# Patient Record
Sex: MALE | Race: WHITE | NOT HISPANIC OR LATINO | Employment: FULL TIME | URBAN - METROPOLITAN AREA
[De-identification: names, ages, dates, MRNs, and addresses within clinical notes are randomized per-mention and may not be internally consistent; named-entity substitution may affect disease eponyms.]

---

## 2022-05-29 ENCOUNTER — HOSPITAL ENCOUNTER (EMERGENCY)
Facility: CLINIC | Age: 47
Discharge: HOME | End: 2022-05-29
Attending: STUDENT IN AN ORGANIZED HEALTH CARE EDUCATION/TRAINING PROGRAM | Admitting: STUDENT IN AN ORGANIZED HEALTH CARE EDUCATION/TRAINING PROGRAM
Payer: COMMERCIAL

## 2022-05-29 VITALS
HEIGHT: 69 IN | OXYGEN SATURATION: 97 % | SYSTOLIC BLOOD PRESSURE: 116 MMHG | DIASTOLIC BLOOD PRESSURE: 64 MMHG | BODY MASS INDEX: 35.55 KG/M2 | TEMPERATURE: 98.6 F | RESPIRATION RATE: 18 BRPM | WEIGHT: 240 LBS | HEART RATE: 87 BPM

## 2022-05-29 DIAGNOSIS — S81.811A LACERATION OF RIGHT LOWER EXTREMITY, INITIAL ENCOUNTER: ICD-10-CM

## 2022-05-29 PROCEDURE — 12002 RPR S/N/AX/GEN/TRNK2.6-7.5CM: CPT

## 2022-05-29 PROCEDURE — 99283 EMERGENCY DEPT VISIT LOW MDM: CPT

## 2022-05-29 ASSESSMENT — ENCOUNTER SYMPTOMS: WOUND: 1

## 2022-05-30 NOTE — ED TRIAGE NOTES
Pt arrives with laceration 45 minutes prior to arrival to right shin. Bleeding controlled in triage.      Triage Assessment     Row Name 05/29/22 2042       Triage Assessment (Adult)    Airway WDL WDL       Respiratory WDL    Respiratory WDL WDL       Skin Circulation/Temperature WDL    Skin Circulation/Temperature WDL WDL       Cardiac WDL    Cardiac WDL WDL       Peripheral/Neurovascular WDL    Peripheral Neurovascular WDL WDL       Cognitive/Neuro/Behavioral WDL    Cognitive/Neuro/Behavioral WDL WDL

## 2022-05-30 NOTE — ED PROVIDER NOTES
EMERGENCY DEPARTMENT ENCOUNTER      NAME: Nathan Cormier  AGE: 47 year old male  YOB: 1975  MRN: [id number]  EVALUATION DATE & TIME: 5/29/2022  8:45 PM    PCP: No primary care provider on file.    ED PROVIDER: Dov Mittal M.D.      Chief Complaint   Patient presents with     Laceration         FINAL IMPRESSION:  1. Laceration of right lower extremity, initial encounter          ED COURSE & MEDICAL DECISION MAKING:    Pertinent Labs & Imaging studies reviewed. (See chart for details)    8:54 PM Met with patient for initial interview and exam. Discussed initial plan for care for their stay in the emergency department.  9:17 PM Conducted laceration repair.  9:37 PM Completed laceration repair, discussed plans for discharge.    47 year old male presents to the Emergency Department for evaluation of laceration to his right leg.  This was repaired without complication here in the emergency department patient tolerated well.  Will discharge home with follow-up.    At the conclusion of the encounter I discussed the results of all of the tests and the disposition. The questions were answered. The patient or family acknowledged understanding and was agreeable with the care plan.         MEDICATIONS GIVEN IN THE EMERGENCY:  Medications - No data to display    NEW PRESCRIPTIONS STARTED AT TODAY'S ER VISIT  There are no discharge medications for this patient.         =================================================================    HPI    Patient information was obtained from: patient    Use of : N/A      Nathan Cormier is a 47 year old male with no documented pertinent history who presents to this ED via walk in for evaluation of a laceration.    Patient reports that one hour prior to arrival he was in the garage when a toolbox his son had made fell and hit his right shin. An edge of this toolbox sliced open a laceration on the right shin. Patient states that it bled significantly but was able to  "control it with pressure and paper towels. Patient does not have a tetanus immunization on file. Otherwise denies any other complaints.    REVIEW OF SYSTEMS   Review of Systems   Skin: Positive for wound (laceration to right shin).   All other systems reviewed and are negative.    PAST MEDICAL HISTORY:  No past medical history on file.    PAST SURGICAL HISTORY:  No past surgical history on file.    CURRENT MEDICATIONS:    No current outpatient medications on file.    ALLERGIES:  No Known Allergies    FAMILY HISTORY:  No family history on file.    SOCIAL HISTORY:   Social History     Socioeconomic History     Marital status:        VITALS:  /64   Pulse 87   Temp 98.6  F (37  C) (Oral)   Resp 18   Ht 1.753 m (5' 9\")   Wt 108.9 kg (240 lb)   SpO2 97%   BMI 35.44 kg/m        PHYSICAL EXAM    Constitutional:  Well developed, well nourished   EYES: Conjunctivae clear, no discharge  HENT: Atraumatic, normocephalic, bilateral external ears normal.  Oropharynx moist. Nose normal.   Neck: Normal ROM , Supple   Respiratory:  No respiratory distress, normal nonlabored respirations.   Cardiovascular:  Distal perfusion appears intact  Musculoskeletal:  No edema appreciated, No cyanosis, No clubbing. Good range of motion in all major joints.   Integument:  Warm, Dry, No erythema, No rash. 3.5 cm curved laceration to right shin, neurovascularly intact.  Neurologic:  Alert and oriented. No focal deficits noted.  Ambulatory  Psychiatric:  Affect normal       LAB:  All pertinent labs reviewed and interpreted.  Labs Ordered and Resulted from Time of ED Arrival to Time of ED Departure - No data to display    RADIOLOGY:  Reviewed all pertinent imaging. Please see official radiology report.  No orders to display       PROCEDURES:   PROCEDURE: Laceration Repair   INDICATIONS: Laceration   PROCEDURE PROVIDER: Dr Dov Mittal   SITE: Right shin   TYPE/SIZE: simple, clean and no foreign body visualized  3.5 cm (total " length)   FUNCTIONAL ASSESSMENT: Distal sensation, circulation and motor intact   MEDICATION: 8 mLs of 1% Lidocaine with epinephrine   PREPARATION: scrubbing with Normal saline   DEBRIDEMENT: no debridement   CLOSURE:  Superficial layer closed with 10 stitches of 4-0 Ethilon simple interrupted       I, Janae Campos, am serving as a scribe to document services personally performed by Dr. Dov Mittal based on my observation and the provider's statements to me. I, Dov Mittal MD attest that Janae Campos is acting in a scribe capacity, has observed my performance of the services and has documented them in accordance with my direction.    Dov Mittal M.D.  Emergency Medicine  [facility] EMERGENCY ROOM  [address] [facility] [id number] [phone]  Dept: [phone]     Dov Mittal MD  06/05/22 0939